# Patient Record
Sex: FEMALE | Race: WHITE | Employment: STUDENT | URBAN - METROPOLITAN AREA
[De-identification: names, ages, dates, MRNs, and addresses within clinical notes are randomized per-mention and may not be internally consistent; named-entity substitution may affect disease eponyms.]

---
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
## 2020-08-10 ENCOUNTER — HOSPITAL ENCOUNTER (EMERGENCY)
Facility: HOSPITAL | Age: 20
Discharge: HOME | End: 2020-08-10
Attending: EMERGENCY MEDICINE
Payer: COMMERCIAL

## 2020-08-10 VITALS
SYSTOLIC BLOOD PRESSURE: 128 MMHG | HEIGHT: 69 IN | TEMPERATURE: 98 F | HEART RATE: 70 BPM | WEIGHT: 165 LBS | DIASTOLIC BLOOD PRESSURE: 82 MMHG | RESPIRATION RATE: 18 BRPM | OXYGEN SATURATION: 99 % | BODY MASS INDEX: 24.44 KG/M2

## 2020-08-10 DIAGNOSIS — Z20.822 ENCOUNTER FOR SCREENING LABORATORY TESTING FOR COVID-19 VIRUS IN ASYMPTOMATIC PATIENT: Primary | ICD-10-CM

## 2020-08-10 PROCEDURE — 99283 EMERGENCY DEPT VISIT LOW MDM: CPT

## 2020-08-10 NOTE — ED PROVIDER NOTES
Patient Seen in: [facility] Emergency Department    History   Patient presents with:  Testing    Stated Complaint: Covid Testing     HPI    Patient is here to get tested for COVID. She denies having any symptoms.   She states she feels completely oximetry       Disposition and Plan     We recommend that you schedule follow up care with a primary care provider within the next three months to obtain basic health screening including reassessment of your blood pressure.       Clinical Impression:  Trevor

## 2020-08-12 LAB — SARS-COV-2 BY PCR: NOT DETECTED

## 2020-11-30 ENCOUNTER — HOSPITAL ENCOUNTER (OUTPATIENT)
Age: 20
Discharge: HOME | End: 2020-11-30
Attending: EMERGENCY MEDICINE
Payer: COMMERCIAL

## 2020-11-30 VITALS
RESPIRATION RATE: 18 BRPM | OXYGEN SATURATION: 100 % | TEMPERATURE: 98 F | HEART RATE: 74 BPM | SYSTOLIC BLOOD PRESSURE: 116 MMHG | DIASTOLIC BLOOD PRESSURE: 68 MMHG

## 2020-11-30 DIAGNOSIS — Z20.822 ENCOUNTER FOR LABORATORY TESTING FOR COVID-19 VIRUS: Primary | ICD-10-CM

## 2020-11-30 PROCEDURE — 99213 OFFICE O/P EST LOW 20 MIN: CPT

## 2020-11-30 NOTE — ED INITIAL ASSESSMENT (HPI)
REQUESTING COVID TESTING. STATES A FRIEND WHO SHE WAS WITH LAST Wednesday IS CURRENTLY AWAITING COVID TEST RESULTS, HE IS ASYMPTOMATIC BUT GOT TESTED TO VISIT FAMILY. PATIENT DENIES COUGH, FEVERS, CHILLS BODY ACHES.   STATES SHE HAS HAD MILD NASAL CONGEST

## 2020-11-30 NOTE — ED PROVIDER NOTES
Patient Seen in: [facility]      History   Patient presents with:  Testing    Stated Complaint: covid test    HPI    21year old female presents for evaluation for COVID-19 testing. Patient states that they are asymptomatic.   Patient denies Left Ear: External ear normal.      Nose: Nose normal. No nasal deformity. Mouth/Throat:      Lips: Pink. Eyes:      General: Lids are normal.      Extraocular Movements: Extraocular movements intact.    Pulmonary:      Effort: Pulmonary effort is no

## 2022-06-29 ENCOUNTER — LAB ENCOUNTER (OUTPATIENT)
Dept: LAB | Facility: HOSPITAL | Age: 22
End: 2022-06-29
Attending: ALLERGY & IMMUNOLOGY
Payer: COMMERCIAL

## 2022-06-29 ENCOUNTER — HOSPITAL ENCOUNTER (OUTPATIENT)
Dept: CT IMAGING | Facility: HOSPITAL | Age: 22
Discharge: HOME | End: 2022-06-29
Attending: ALLERGY & IMMUNOLOGY
Payer: COMMERCIAL

## 2022-06-29 DIAGNOSIS — J30.1 ALLERGIC RHINITIS DUE TO POLLEN: ICD-10-CM

## 2022-06-29 DIAGNOSIS — Z91.018 ALLERGY TO OTHER FOODS: Primary | ICD-10-CM

## 2022-06-29 DIAGNOSIS — J32.4 CHRONIC PANSINUSITIS: ICD-10-CM

## 2022-06-29 LAB
BASOPHILS # BLD AUTO: 0.04 X10(3) UL (ref 0–0.2)
BASOPHILS NFR BLD AUTO: 0.5 %
DEPRECATED RDW RBC AUTO: 39.2 FL (ref 35.1–46.3)
EOSINOPHIL # BLD AUTO: 0.25 X10(3) UL (ref 0–0.7)
EOSINOPHIL NFR BLD AUTO: 3 %
ERYTHROCYTE [DISTWIDTH] IN BLOOD BY AUTOMATED COUNT: 11.8 % (ref 11–15)
HCT VFR BLD AUTO: 46.1 %
HGB BLD-MCNC: 15.2 G/DL
IGA SERPL-MCNC: 110 MG/DL (ref 70–312)
IGM SERPL-MCNC: 60.6 MG/DL (ref 43–279)
IMM GRANULOCYTES # BLD AUTO: 0.02 X10(3) UL (ref 0–1)
IMM GRANULOCYTES NFR BLD: 0.2 %
IMMUNOGLOBULIN PNL SER-MCNC: 1230 MG/DL (ref 791–1643)
LYMPHOCYTES # BLD AUTO: 1.46 X10(3) UL (ref 1–4)
LYMPHOCYTES NFR BLD AUTO: 17.6 %
MCH RBC QN AUTO: 29.8 PG (ref 26–34)
MCHC RBC AUTO-ENTMCNC: 33 G/DL (ref 31–37)
MCV RBC AUTO: 90.4 FL
MONOCYTES # BLD AUTO: 0.5 X10(3) UL (ref 0.1–1)
MONOCYTES NFR BLD AUTO: 6 %
NEUTROPHILS # BLD AUTO: 6.03 X10 (3) UL (ref 1.5–7.7)
NEUTROPHILS # BLD AUTO: 6.03 X10(3) UL (ref 1.5–7.7)
NEUTROPHILS NFR BLD AUTO: 72.7 %
PLATELET # BLD AUTO: 307 10(3)UL (ref 150–450)
RBC # BLD AUTO: 5.1 X10(6)UL
WBC # BLD AUTO: 8.3 X10(3) UL (ref 4–11)

## 2022-06-29 PROCEDURE — 36415 COLL VENOUS BLD VENIPUNCTURE: CPT

## 2022-06-29 PROCEDURE — 84165 PROTEIN E-PHORESIS SERUM: CPT

## 2022-06-29 PROCEDURE — 86003 ALLG SPEC IGE CRUDE XTRC EA: CPT

## 2022-06-29 PROCEDURE — 70486 CT MAXILLOFACIAL W/O DYE: CPT | Performed by: ALLERGY & IMMUNOLOGY

## 2022-06-29 PROCEDURE — 86774 TETANUS ANTIBODY: CPT

## 2022-06-29 PROCEDURE — 82784 ASSAY IGA/IGD/IGG/IGM EACH: CPT

## 2022-06-29 PROCEDURE — 83521 IG LIGHT CHAINS FREE EACH: CPT

## 2022-06-29 PROCEDURE — 82787 IGG 1 2 3 OR 4 EACH: CPT

## 2022-06-29 PROCEDURE — 86334 IMMUNOFIX E-PHORESIS SERUM: CPT

## 2022-06-29 PROCEDURE — 85025 COMPLETE CBC W/AUTO DIFF WBC: CPT

## 2022-06-30 LAB
ALMOND IGE QN: <0.1 KUA/L (ref ?–0.1)
CASHEW NUT IGE QN: <0.1 KUA/L (ref ?–0.1)
HAZELNUT IGE QN: 1.16 KUA/L (ref ?–0.1)
KAPPA LC FREE SER-MCNC: 1.11 MG/DL (ref 0.33–1.94)
KAPPA LC FREE/LAMBDA FREE SER NEPH: 0.91 {RATIO} (ref 0.26–1.65)
LAMBDA LC FREE SERPL-MCNC: 1.22 MG/DL (ref 0.57–2.63)
PECAN/HICK NUT IGE QN: 2.98 KUA/L (ref ?–0.1)
WALNUT IGE QN: 7.07 KUA/L (ref ?–0.1)

## 2022-07-01 LAB
ALBUMIN SERPL ELPH-MCNC: 4.63 G/DL (ref 3.75–5.21)
ALBUMIN/GLOB SERPL: 1.51 {RATIO} (ref 1–2)
ALLERGEN, PISTACHIO IGE: <0.1 KU/L
ALPHA1 GLOB SERPL ELPH-MCNC: 0.35 G/DL (ref 0.19–0.46)
ALPHA2 GLOB SERPL ELPH-MCNC: 0.77 G/DL (ref 0.48–1.05)
B-GLOBULIN SERPL ELPH-MCNC: 0.79 G/DL (ref 0.68–1.23)
GAMMA GLOB SERPL ELPH-MCNC: 1.16 G/DL (ref 0.62–1.7)
PROT SERPL-MCNC: 7.7 G/DL (ref 6.4–8.2)

## 2022-07-02 LAB — TETANUS ANTIBODY, IGG: 1.5 IU/ML

## 2022-07-03 LAB
IMMUNOGLOBULIN G SUBCLASS 1: 614 MG/DL
IMMUNOGLOBULIN G SUBCLASS 2: 254 MG/DL
IMMUNOGLOBULIN G SUBCLASS 3: 81 MG/DL
IMMUNOGLOBULIN G SUBCLASS 4: 32 MG/DL

## 2022-08-01 ENCOUNTER — LAB ENCOUNTER (OUTPATIENT)
Dept: LAB | Facility: HOSPITAL | Age: 22
End: 2022-08-01
Attending: ALLERGY & IMMUNOLOGY
Payer: COMMERCIAL

## 2022-08-01 DIAGNOSIS — J32.4 CHRONIC PANSINUSITIS: ICD-10-CM

## 2022-08-01 PROCEDURE — 36415 COLL VENOUS BLD VENIPUNCTURE: CPT

## 2022-08-01 PROCEDURE — 82785 ASSAY OF IGE: CPT

## 2022-08-01 PROCEDURE — 86317 IMMUNOASSAY INFECTIOUS AGENT: CPT

## 2022-08-03 LAB
IGE SERPL-ACNC: 24.9 KU/L (ref 2–214)
PNEUMOCOCCAL SEROTYPE 1 IGG: 2.41 UG/ML
PNEUMOCOCCAL SEROTYPE 10A IGG: 2.08 UG/ML
PNEUMOCOCCAL SEROTYPE 11A IGG: 2.88 UG/ML
PNEUMOCOCCAL SEROTYPE 12F IGG: 0.09 UG/ML
PNEUMOCOCCAL SEROTYPE 14* IGG: 0.28 UG/ML
PNEUMOCOCCAL SEROTYPE 15B IGG: 0.17 UG/ML
PNEUMOCOCCAL SEROTYPE 17F IGG: 0.56 UG/ML
PNEUMOCOCCAL SEROTYPE 18C* IGG: 2.25 UG/ML
PNEUMOCOCCAL SEROTYPE 19A IGG: 6.53 UG/ML
PNEUMOCOCCAL SEROTYPE 19F* IGG: 0.4 UG/ML
PNEUMOCOCCAL SEROTYPE 2 IGG: 0.14 UG/ML
PNEUMOCOCCAL SEROTYPE 20 IGG: 0.74 UG/ML
PNEUMOCOCCAL SEROTYPE 22F IGG: 0.67 UG/ML
PNEUMOCOCCAL SEROTYPE 23F* IGG: 0.77 UG/ML
PNEUMOCOCCAL SEROTYPE 3 IGG: 1.04 UG/ML
PNEUMOCOCCAL SEROTYPE 33F IGG: 0.23 UG/ML
PNEUMOCOCCAL SEROTYPE 4* IGG: 0.13 UG/ML
PNEUMOCOCCAL SEROTYPE 5 IGG: 0.51 UG/ML
PNEUMOCOCCAL SEROTYPE 6B* IGG: 0.76 UG/ML
PNEUMOCOCCAL SEROTYPE 7F IGG: 0.38 UG/ML
PNEUMOCOCCAL SEROTYPE 8 IGG: 0.36 UG/ML
PNEUMOCOCCAL SEROTYPE 9N IGG: 0.07 UG/ML
PNEUMOCOCCAL SEROTYPE 9V*, IGG: 0.18 UG/ML

## 2024-07-11 ENCOUNTER — OFFICE VISIT (OUTPATIENT)
Dept: FAMILY MEDICINE CLINIC | Facility: CLINIC | Age: 24
End: 2024-07-11
Payer: COMMERCIAL

## 2024-07-11 VITALS
HEART RATE: 108 BPM | DIASTOLIC BLOOD PRESSURE: 68 MMHG | RESPIRATION RATE: 16 BRPM | OXYGEN SATURATION: 99 % | TEMPERATURE: 98 F | SYSTOLIC BLOOD PRESSURE: 110 MMHG

## 2024-07-11 DIAGNOSIS — J06.9 VIRAL URI: Primary | ICD-10-CM

## 2024-07-11 PROCEDURE — 3078F DIAST BP <80 MM HG: CPT | Performed by: NURSE PRACTITIONER

## 2024-07-11 PROCEDURE — 99202 OFFICE O/P NEW SF 15 MIN: CPT | Performed by: NURSE PRACTITIONER

## 2024-07-11 PROCEDURE — 3074F SYST BP LT 130 MM HG: CPT | Performed by: NURSE PRACTITIONER

## 2024-07-11 RX ORDER — EPINEPHRINE 0.3 MG/.3ML
0.3 INJECTION SUBCUTANEOUS
COMMUNITY

## 2024-07-11 RX ORDER — DROSPIRENONE AND ETHINYL ESTRADIOL 0.02-3(28)
1 KIT ORAL DAILY
COMMUNITY

## 2024-07-12 NOTE — PROGRESS NOTES
CHIEF COMPLAINT:     Chief Complaint   Patient presents with    Sinus Problem     Sx 5 days - Sinus pressure, nasal congestion, PND, body aches, lightheadedness, chills  Denies ST, ear pain/pressure, SOB, fever  No Covid test was done at home  OTC DayQuil       HPI:   Yumiko Dietz is a 23 year old female who presents for upper respiratory symptoms for  4 days. Patient reports congestion, dry cough.  Associated symptoms include sinus pressure, pnd, clearing cough, body aches, slight light headedness past 2 days if standing up too fast.  Denies fever, chills, headache, sinus pain.  Symptoms have been consistent since onset.  Treating symptoms with dayquil on 1st day of illness only.       + hx of COVID 2020; No COVID exposure  + recent travel [place].  No large gatherings.      Current Outpatient Medications   Medication Sig Dispense Refill    Drospirenone-Ethinyl Estradiol 3-0.02 MG Oral Tab Take 1 tablet by mouth daily.      EPINEPHrine 0.3 MG/0.3ML Injection Solution Auto-injector Inject 0.3 mL (1 each total) into the muscle.        History reviewed. No pertinent past medical history.   Past Surgical History:   Procedure Laterality Date    Tonsillectomy           Social History     Socioeconomic History    Marital status: Single   Tobacco Use    Smoking status: Never    Smokeless tobacco: Never         REVIEW OF SYSTEMS:   GENERAL: feels well otherwise,  normal appetite  SKIN: no rashes or abnormal skin lesions  HEENT: See HPI  LUNGS: denies shortness of breath or wheezing, See HPI  CARDIOVASCULAR: denies chest pain or palpitations   GI: denies N/V/C or abdominal pain  NEURO: denies dizziness or lightheadedness    EXAM:   /68   Pulse 108   Temp 98.4 °F (36.9 °C)   Resp 16   SpO2 99%     Physical Exam  Vitals reviewed.   Constitutional:       General: She is not in acute distress.     Appearance: Normal appearance. She is well-developed and well-groomed. She is not ill-appearing.   HENT:      Head:  Normocephalic and atraumatic.      Right Ear: Tympanic membrane and ear canal normal.      Left Ear: Tympanic membrane and ear canal normal.      Nose: Congestion and rhinorrhea present. Rhinorrhea is clear.      Right Sinus: No maxillary sinus tenderness or frontal sinus tenderness.      Left Sinus: No maxillary sinus tenderness or frontal sinus tenderness.      Mouth/Throat:      Lips: Pink.      Mouth: Mucous membranes are moist.      Pharynx: Oropharynx is clear. Uvula midline.      Comments: Tonsils absent  Eyes:      Extraocular Movements: Extraocular movements intact.      Conjunctiva/sclera: Conjunctivae normal.   Cardiovascular:      Rate and Rhythm: Normal rate and regular rhythm.      Heart sounds: Normal heart sounds. No murmur heard.  Pulmonary:      Effort: Pulmonary effort is normal.      Breath sounds: Normal breath sounds and air entry.   Musculoskeletal:      Cervical back: Normal range of motion and neck supple.   Lymphadenopathy:      Cervical: No cervical adenopathy.   Skin:     General: Skin is warm and dry.      Findings: No rash.   Neurological:      General: No focal deficit present.      Mental Status: She is alert.          No results found for this or any previous visit (from the past 24 hour(s)).    ASSESSMENT AND PLAN:   Yumiko Dietz is a 23 year old female who presents with     ASSESSMENT:   Encounter Diagnosis   Name Primary?    Viral URI Yes       PLAN:   Declined covid testing.  Discussed likely viral etiology. Reviewed symptom relief measures with patient.   Monitor for worsening symptoms.  Comfort care as described in Patient Instructions  Medications as below.      Meds & Refills for this Visit:  Requested Prescriptions      No prescriptions requested or ordered in this encounter       Risks, benefits, and side effects of medication explained and discussed.  To f/u with PCP if sx do not resolve as anticipated.  The patient indicates understanding of these issues and agrees to  the plan.        Patient Instructions   If prescribed, take antibiotics as directed. Finish all the medication even if you feel better.   Probiotics or 1-2 servings of yogurt daily during antibiotic use will help decrease stomach upset and restore good bacteria to the gut/prevent antibiotic associated diarrhea. Separate times by at least 2-4 hours.    General comfort measures:  Get rest!  Hydrate! (cold or hot based on comfort). Drink lots of water or other non dehydrating liquids to help with illness. Salty foods, soups and tea can help with throat pain.   Hand washing-use hand  or wash hands frequently, cover your cough or sneeze, do not share towels or drinks with others.  Salt water gargles (1 tsp. Salt in 6 oz lukewarm water): gargle for 2 minutes, repeat every 15 minutes as needed to help decrease swelling and relieve pain.  Use humidified air, steamy showers/baths and use vaporizer in sleeping quarters to keep secretions thin.  Avoid smoking.    Symptom management:    Nasal congestion/Post-nasal-drip: Saline nasal spray to nostrils to help remove drainage or an antihistamine to help dry up drainage.    Sinus congestion/Post-nasal-drip: OTC Nasacort or Flonase (steroid nasal spray) nightly for 2 weeks. May take Sudafed (D) or Sudafed-PE, if not contraindicated (do not take if you have HTN).   Pain/discomfort:  May use Tylenol or Ibuprofen, if not contraindicated.  Cough:  May take DM-dextromethorophan over the counter (long lasting). Ex: Delsym  Chest congestion:  May take guaifenesin with a lot of water.  Ex:  Plain Mucinex.  Sore throat:  Cepacol lozenges or Chloroseptic throat spray (active ingredient Benzocaine).      Follow up with your PCP in 1-2 weeks if not better.  Follow up in a few days if worsening symptoms. Seek immediate care if inability to swallow or breathe.

## (undated) NOTE — ED AVS SNAPSHOT
[facility]    [address]     [place] [address]    Phone:  [phone]    Fax:  468 [facility]   MRN: [id number]    Department:  [facility]   Date of Visit:  5/17/ and Class Registration line at [phone] or find a doctor online by visiting [email or web address].    IF THERE IS ANY CHANGE OR WORSENING OF YOUR CONDITION, CALL YOUR PRIMARY CARE PHYSICIAN AT ONCE OR RETURN IMMEDIATELY TO [address] [place] [address].     If

## (undated) NOTE — ED AVS SNAPSHOT
[facility]    [address]     [place] [address]    Phone:  [phone]    Fax:  [address] [facility]   MRN: [id number]    Department:  [facility] Emergency Department   Date of Visit:  5/17/ It is our goal to assure that you are completely satisfied with every aspect of your visit today.   In an effort to constantly improve our service to you, we would appreciate any positive or negative feedback related to the care you received in our emergenc [employer] account. You may have had testing done that requires us to contact you. Please make sure we have your correct phone number on file.       I certified that I have received a copy of the aftercare instructions; that these instructions have been expl their recent   visit, view other health information and more. To sign up or find more information on getting   Proxy Access to your child’s MyChart go to [email or web address] and click on the   Sign Up Forms link in the Additional Information box